# Patient Record
Sex: MALE | URBAN - METROPOLITAN AREA
[De-identification: names, ages, dates, MRNs, and addresses within clinical notes are randomized per-mention and may not be internally consistent; named-entity substitution may affect disease eponyms.]

---

## 2021-01-25 ENCOUNTER — OFFICE VISIT - HEALTHEAST (OUTPATIENT)
Dept: CARDIOLOGY | Facility: CLINIC | Age: 26
End: 2021-01-25

## 2021-01-25 DIAGNOSIS — Z00.6 EXAMINATION OF PARTICIPANT OR CONTROL IN CLINICAL RESEARCH: ICD-10-CM

## 2021-01-25 RX ORDER — ATORVASTATIN CALCIUM 10 MG/1
10 TABLET, FILM COATED ORAL AT BEDTIME
Status: SHIPPED | COMMUNITY
Start: 2021-01-25

## 2021-01-27 ENCOUNTER — AMBULATORY - HEALTHEAST (OUTPATIENT)
Dept: CARDIOLOGY | Facility: CLINIC | Age: 26
End: 2021-01-27

## 2021-01-27 DIAGNOSIS — Z00.6 EXAMINATION OF PARTICIPANT IN CLINICAL TRIAL: ICD-10-CM

## 2021-01-27 ASSESSMENT — MIFFLIN-ST. JEOR: SCORE: 2047.21

## 2021-02-17 ENCOUNTER — AMBULATORY - HEALTHEAST (OUTPATIENT)
Dept: CARDIOLOGY | Facility: CLINIC | Age: 26
End: 2021-02-17

## 2021-02-17 DIAGNOSIS — Z00.6 EXAMINATION OF PARTICIPANT IN CLINICAL TRIAL: ICD-10-CM

## 2021-03-03 ENCOUNTER — AMBULATORY - HEALTHEAST (OUTPATIENT)
Dept: CARDIOLOGY | Facility: CLINIC | Age: 26
End: 2021-03-03

## 2021-03-03 DIAGNOSIS — Z00.6 EXAMINATION OF PARTICIPANT IN CLINICAL TRIAL: ICD-10-CM

## 2021-03-03 ASSESSMENT — MIFFLIN-ST. JEOR: SCORE: 2029.52

## 2021-05-27 VITALS
SYSTOLIC BLOOD PRESSURE: 142 MMHG | TEMPERATURE: 98.4 F | HEART RATE: 90 BPM | RESPIRATION RATE: 18 BRPM | DIASTOLIC BLOOD PRESSURE: 88 MMHG | OXYGEN SATURATION: 95 %

## 2021-06-05 VITALS
WEIGHT: 230 LBS | BODY MASS INDEX: 32.93 KG/M2 | HEIGHT: 70 IN | OXYGEN SATURATION: 96 % | RESPIRATION RATE: 18 BRPM | HEART RATE: 89 BPM | DIASTOLIC BLOOD PRESSURE: 92 MMHG | SYSTOLIC BLOOD PRESSURE: 130 MMHG | TEMPERATURE: 98.2 F

## 2021-06-05 VITALS
HEART RATE: 109 BPM | HEIGHT: 70 IN | SYSTOLIC BLOOD PRESSURE: 130 MMHG | DIASTOLIC BLOOD PRESSURE: 94 MMHG | TEMPERATURE: 98.3 F | WEIGHT: 233.9 LBS | BODY MASS INDEX: 33.49 KG/M2

## 2021-06-16 NOTE — PROGRESS NOTES
How many people that you live with, currently attend school or  facilities outside of the home? None  If more than 1 person reported in question above,   Does anyone that the subject lives with, work in a job that requires in close proximity (less than 6 Unknown  feet) to other people, such as the jobs listed earlier []? Yes   [x]? No

## 2021-06-18 NOTE — PATIENT INSTRUCTIONS - HE
Patient Instructions by Melany Coy RN at 2/17/2021  8:00 AM     Author: Melany Coy RN Service: -- Author Type: Registered Nurse    Filed: 2/17/2021  8:20 AM Encounter Date: 2/17/2021 Status: Signed    : Melany Coy RN (Registered Nurse)           PREVENT-19 Day 21  Thank you for coming in today for your 2nd injection.  Please continue to add your temperature daily in the zahra.  If you have symptoms, please wait to start the nasal swab collections until you have been contacted by the study team and instructed to do so.    Your next visit is Day 35. We have scheduled this for 03/03/21 0800  If you have questions, you may call the [facility] Research department at [phone]    Best regards,  Melany Coy RN

## 2021-06-18 NOTE — PATIENT INSTRUCTIONS - HE
Patient Instructions by Edison Mcmanus at 3/3/2021  8:00 AM     Author: Edison Mcmanus Service: -- Author Type: Research Associate    Filed: 3/3/2021  8:06 AM Encounter Date: 3/3/2021 Status: Signed    : Edison Mcmanus (Research Associate)           Thank you for coming in today for the Day 35 visit.  Your next study visit is Month 3.   Reminder: this visit will be at the [place] [place] [facility] ([facility]).  The address for the [facility] is: [address] [place] [address]  The phone number is [phone]  We have provided you with a map for your reference too.    The study team from the  [facility] will be in contact with you to schedule your next study visit.     The PREVENT-19 team at [facility] wishes you all the best and appreciates the opportunity to partner with you in this vaccine trial.  We wish you all the best in the future.    Best regards,  Edison Mcmanus, Research Associate

## 2021-06-30 NOTE — PROGRESS NOTES
"Progress Notes by Nancy Lassiter RN at 1/27/2021  8:00 AM     Author: Nancy Lassiter RN Service: -- Author Type: Registered Nurse    Filed: 1/27/2021 10:50 AM Encounter Date: 1/27/2021 Status: Signed    : Dionne Baeza MD (Physician)    Related Notes: Original Note by Nancy Lassiter RN (Registered Nurse) filed at 1/27/2021 10:47 AM           Study Name: PREVENT-19  : Ijeoma Danielle MD   Sub Investigator: Dre Baeza MD    Protocol version: 3.0, 16 NOV 2020    Inclusion Criteria  Criteria #   Inclusion Criteria (all must be yes)    1  Adults ? 18 years of age at screening who, by virtue of age, race, ethnicity or life circumstances, are considered at substantial risk of exposure to and infection with SARS-CoV-2   Yes   2  Willing and able to give informed consent prior to study enrollment and to comply with study procedu   Yes   3  Participants of childbearing potential (defined as any participant who has experienced menarche and who is NOT surgically sterile [ie, hysterectomy, bilateral tubal ligation, or bilateral oophorectomy] or postmenopausal [defined as amenorrhea at least 12 consecutive months]) must agree to be heterosexually inactive from at least 28 days prior to enrollment and through 3 months after the last vaccination OR agree to consistently use a medically acceptable method of contraception from at least 28 days prior to enrollment and through 3 months after the last vaccination   Yes   4  Is medically stable, as determined by the investigator (based on review of health status, vital signs (TPRBP) medical history, & targeted physical examination (weight)  VS must be within medically acceptable ranges prior to the first vaccination.   Yes   5  Agree to not participate in any other SARS-CoV-2 prevention trial during the study follow-up.   Yes     Exclusion Criteria  Criteria #  -all must be \"no\"    1  Unstable acute or chronic illness. Criteria " for unstable medical conditions include   a. Substantive changes in chronic prescribed medication (change in class or significant change in dose) in the past 2 months  b. Currently undergoing workup of undiagnosed illness that could lead to diagnosis of a new condition   Note: Well-controlled human immunodeficiency virus [HIV] with undetectable HIV RNA and CD4 count > 200 cells/?L for at least 1 year, documented within the last 6 months, is NOT considered an unstable chronic illness.    No   2  Participation in research involving an investigational product (drug/biologic/device) within 45 days prior to first study vaccination   No   3  History of a previous laboratory-confirmed diagnosis of SARS-CoV-2 infection or COVID-19   No   4  Received influenza vaccination or any other adult vaccine within 4 days prior to or within 7 days after either study vaccination   No   5  Autoimmune or immunodeficiency disease/condition (iatrogenic or congenital) requiring ongoing immunomodulatory therapy NOTE: Stable endocrine disorders (eg, thyroiditis, pancreatitis), including stable diabetes mellitus with no history of diabetic ketoacidosis) are NOT excluded   No   6  Chronic administration (defined as > 14 continuous days) of immunosuppressant, systemic glucocorticoids, or other immune-modifying drugs within 90 days prior to first study vaccination. NOTE: An immunosuppressant dose of glucocorticoid is defined as a systemic dose ? 20 mg of prednisone per day or equivalent. The use of topical, inhaled, and nasal glucocorticoids is permitted. Topical tacrolimus and ocular cyclosporin are permitted   No   7  Received immunoglobulin, blood-derived products, or immunosuppressant drugs within 90 days prior to first study vaccination   No   8  Active cancer (malignancy) on therapy within 1 year prior to first study vaccination (with the exception of malignancy cured via excision, at the discretion of the investigator)   No   9  Any known  allergies to products contained in the investigational product.   No   10  Participants who are breastfeeding, pregnant or who plan to become pregnant within 3 months following last study vaccination   No   11  Any other condition that, in the opinion of the investigator, would pose a health risk to the participant if enrolled or could interfere with evaluation of the trial vaccine or interpretation of study results.   No   12  Study team member or first-degree relative of any study team member (inclusive of Sponsor, and study site personnel involved in the study)   No   13  Current participation in any other COVID-19 prevention clinical trial.   No       Subject has met all inclusion criteria and no exclusion criteria have been met.   Subject is ready to fully enrolled in the PREVENT-19 study.    Nancy Lassiter RN       Visits Screening/Baseline (1)    Time seated: 0815    The study discussion continued with an introduction of the study purpose and the qualifications for participation.     The consent discussion included the following:    Description of trial    Number of Participants    Risks and Discomforts    Unforeseeable Risks    Benefits    Alternative Procedures or Treatments    Confidentiality    Compensation and Medical Treatments in Event of Injury    Contacts    Voluntary Participation    Involuntary Termination of Participant's Participation    Additional Costs to Participant    Consequences of Participant's Decision to Withdraw    Providing Significant New Findings to Participants      Jorge Luis Treviño was provided time to consider participation and ask questions.  All questions answered to his satisfaction.  Jorge Luis Treviño was queried regarding their understanding of the trial. he was able to correctly answer the following questions:    Double blind placebo control    Follow up visits    Need to report side effects and/or possible COVID-19 symptoms       Jorge Luis Treviño has agreed to participate in  "the \"PREVENT-19\" COVID-19 vaccine clinical trial.  Consent form signed (version 3, IRB approved Nov 25, 2020), copies of consent signatures provided to participant.  No study procedures performed prior to obtaining consent to participate.       In person, scheduled  In the last 2 weeks, did the participant attend any large gatherings (> 10 people), or come in close contact (<6 feet) with a person known to  have COVID-19, returned to school or to work in-person?  [] Yes   [x] No    Reviewed Inclusion/Exclusion criteria--please refer to that note for details and for co-sign by Dr. Baeza/LAURIE.    1995   male  Ethnicity   []  or    [x] Not  or   Race   []  or    [x]    [] Black or   []  or other    [] White    Occupation   Attending school in person   [] Yes   [x] No   Currently working    [x] Yes   [] No    If yes: Required to be in close proximity (<6 ft) to other people?   [] Yes   [x] No   How often is participant required to be present in workplace (I.e., not work from home [[employer]])    [x] 0 days/week  [] 1 day/week  [] 2-4 days/week  [] >5 days/week  Do people in participant's main workplace use PPE? [] Yes   [] No N/A    Living Situation  How many people live with the subject (other than subject)? 2  Total people under 18 years of age 0  Total people between 18-64 years of age 2  Total people 65 years of age or older  0    How many people that you live with, currently attend school or  facilities outside of the home? No  If more than 1 person reported in question above,   Does anyone that the subject lives with, work in a job that requires in close proximity (less than 6 Unknown  feet) to other people, such as the jobs listed earlier [] Yes   [] No    Lifestyle  Does the subject have a history of smoking or vaping? [] Yes   [x] No  Is the subject currently smoking or vaping?   [] Yes   [x] " "No    Has the subject experienced any past and/or concomitant medical conditions or significant surgical procedures? [] Yes   [x] No  If yes, please see medical history below.    Randomization  Date of randomization: 27 Jan2021  Randomization number: [id number]  Activation code: [id number]  Age group  [x] 18-64 years  [] > 65 years    Physical Exam  Please refer to note for PE details  VS-T,P, R, BP, wt, ht  Visit Vitals  BP (!) 143/92 (Patient Site: Left Arm, Patient Position: Sitting, Cuff Size: Adult Regular)   Pulse 96   Temp 98.3  F (36.8  C) (Oral)   Ht 5' 10\" (1.778 m)     There were no vitals filed for this visit.   Height: 5'10\"  Weight 233.9 lbs    Study labs (Immunogenicity, SARS-CoV-2 (anti-NP))  drawn   [x] Yes   Time 0856 [] No, why?     Nasal swab collection:  [x] Yes   Time 0903 [] No, why?          Investigational vaccine injected left deltoid at 0945      Participant waited in Clinical Research Unit (CRU) for @ least 30 minutes after receiving injection.  Participant experienced no adverse symptoms prior to leaving CRU  Study team reviewed completion instructions with participant  Participant provided with eDiary to record all subsequent AEs and COVID-19 symptomatology.  Participant provided with nasal swab home collection kit and instructions on completion (refer to XXX for complete details)  Participant provided with thermometer and reviewed instructions on how to take oral temperature.  Participant provided with a ruler to measure any site reactions that occur; instructed on how to use.  Participant provided with After Visit Summary that includes these instructions and next appointments.  he confirms that all questions have been answered to his satisfaction.    Plan: Next study visit (Visit 2) scheduled Visit date not found    Discharge time 1015    Nancy Lassiter RN    Current Outpatient Medications   Medication Sig   ? atorvastatin (LIPITOR) 10 MG tablet Take 10 mg by mouth at bedtime.   ? " fluticasone propion-salmeteroL (ADVAIR) 100-50 mcg/dose DISKUS Inhale 1 puff 2 (two) times a day.       Past Medical History:   Diagnosis Date   ? Hyperlipidemia 2018   ? TMJ (dislocation of temporomandibular joint) 2014

## 2021-06-30 NOTE — PROGRESS NOTES
"Progress Notes by Edison Mcmanus at 3/3/2021  8:00 AM     Author: Edison Mcmanus Service: -- Author Type: Research Associate    Filed: 3/3/2021  8:33 AM Encounter Date: 3/3/2021 Status: Signed    : Edison Mcmanus (Research Associate)           Visit 3  3/3/21  In person, scheduled  In the last 2 weeks, did the participant attend any large gatherings (> 10 people), or come in close contact (<6 feet) with a person known to have COVID-19, returned to school or to work in-person?  [] Yes   [x] No    Ongoing consent process: review schedule of events for today, and upcoming appointments; provided him with an update on known overall study progress; reviewed eDiary expectations; reiterated expected AEs (especially associated with injection site).  he has no further questions or concerns at this time.     Adverse Event/Serious Adverse Event: asked participant if any AE/SAEs occurred since last contact (2/17/21)  Patient reports no AE/SAEs since the second injection.       Physical Exam   Please refer to Emilee Brady (PA) note for PE details    Visit Vitals  BP (!) 130/92 (Patient Site: Left Arm, Patient Position: Sitting, Cuff Size: Adult Large) (NCS)   Pulse 89   Temp 98.2  F (36.8  C) (Oral)   Resp 18   Ht 5' 10\" (1.778 m)   Wt (!) 230 lb (104.3 kg)   SpO2 96%   BMI 33.00 kg/m      Vitals:    03/03/21 0808   Weight: (!) 230 lb (104.3 kg)          Study labs ([facility], Immunogenicity, SARS-CoV-2 (anti-NP))  drawn   [x] Yes   Time 8:20 AM [] No, why?   Requisition number: [id number]      Reviewed eDiary with participant.  Study team reviewed completion instructions with participant and questions answered to his satisfaction.      Plan: Next study visit (Visit 4) scheduled Visit date not found   NOTE: remind participant that this next visit will occur @ [facility] (noted on AVS)      Edison Mcmanus       "

## 2021-06-30 NOTE — PROGRESS NOTES
Progress Notes by Melany Coy RN at 2/17/2021  8:00 AM     Author: Melany Coy RN Service: -- Author Type: Registered Nurse    Filed: 2/17/2021  9:54 AM Encounter Date: 2/17/2021 Status: Signed    : Melany Coy RN (Registered Nurse)           Visit 2  2/17/21  In person, scheduled  Time seated:0805  In the last 2 weeks, did the participant attend any large gatherings (> 10 people), or come in close contact (<6 feet) with a person known to  have COVID-19, returned to school or to work in-person?  [] Yes   [x] No    Reviewed Inclusion/Exclusion criteria--please refer to that note for details and for co-sign by Dr. Baeza.   Participants meeting the following criterion may be delayed for subsequent vaccination:     Respiratory symptoms in the past 3 days (ie, body temperature of > 38.0 C, cough, sore throat, difficulty breathing).   o Participant may be vaccinated when all symptoms have been resolved for > 3 days.   o Out of window vaccination is allowed for this reason.     Ongoing consent process: review schedule of events for today, and upcoming appointments; provided him with an update on known overall study progress; reviewed eDiary expectations; reiterated expected AEs (especially associated with injection site).  he has no further questions or concerns at this time.     Adverse Event/Serious Adverse Event: asked participant if any AE/SAEs occurred since last contact (1/27/21 date of last contact)    he denies AE/SAEs          Physical Exam   Please refer to PA  note for PE details  VS-T,P, R, BP  Visit Vitals  /88 (Patient Site: Right Arm)   Pulse 90   Temp 98.4  F (36.9  C) (Oral)   Resp 18   SpO2 95%       Study labs SARS-CoV-2 vaccine immunogenicity (IgG antibody to SARS-CoV-2 S protein, MN, hACE2 inhibition) drawn   [x] Yes   Time 0833 [] No, why?   Requisition number: [id number]    Administrations This Visit     Study SARS-CoV-2 vaccine vs. placebo (IDS# [id number]) injection injection  0.5 mL     Admin Date  02/17/2021 0907 Action  Given Dose  0.5 mL Route  Intramuscular Administered By  Melany Coy RN                  Reviewed eDiary with participant.  Study team reviewed completion instructions with participant and questions answered to his satisfaction.  Participant waited in Clinical Research Unit (CRU) for @ least 30 minutes after receiving injection.  Participant experienced no adverse symptoms prior to leaving CRU  Discharge time: 0900    Plan: Next study visit (Visit 3) scheduled       Melany Coy RN

## 2021-06-30 NOTE — PROGRESS NOTES
Progress Notes by Emilee Brady PA-C at 2/17/2021  8:00 AM     Author: Emilee Brady PA-C Service: -- Author Type: Physician Assistant    Filed: 2/17/2021  8:57 AM Encounter Date: 2/17/2021 Status: Signed    : Emilee Brady PA-C (Physician Assistant)             What was the body system examined: Defaulted value based on protocol requirements and will not require .   System  Normal/Abnormal  If abnormal, CS?   If abnormal, describe    Skin  Normal  Not applicable     HEENT Normal  Not applicable     Neck  Normal  Not applicable     Thyroid  Normal  Not applicable      Lungs  Normal  Not applicable     Heart  Normal  Not applicable     Abdomen  Normal  Not applicable     Lymph Nodes  Normal  Not applicable     Musculoskeletal/Extremities  Normal  Not applicable     Other  Normal  Not applicable        What was the overall interpretation: Please select from the dropdown list.  Normal    COLTEN Suarez PA-C

## 2021-06-30 NOTE — PROGRESS NOTES
"Progress Notes by La Nena Hemphill RN at 1/25/2021  8:00 AM     Author: La Nena Hemphill RN Service: -- Author Type: Registered Nurse    Filed: 1/25/2021  9:08 AM Encounter Date: 1/25/2021 Status: Signed    : La Nena Hemphill RN (Registered Nurse)     Summary: Novavax vaccine study          Study name PREVENT-19  Protocol Title:  A Phase 3, Randomized, Observer-Blinded, Placebo-Controlled Study to Evaluate the Efficacy, Safety, and Immunogenicity of a SARS-CoV-2 Recombinant Tim Protein Nanoparticle Vaccine (SARS-CoV-2 rS) with Matrix-M1? Adjuvant in Adult Participants ? 18 Years     Research La Nena Hemphill RNnurse associate spoke with Jorge Luis Treviño and/or their family by phone to discuss participation in the Novavax study.    The study discussion continued with an introduction of the study purpose and the qualifications for participation.     The consent discussion included the following:    Description of trial    Number of Participants    Risks and Discomforts    Unforeseeable Risks    Benefits    Alternative Procedures or Treatments    Confidentiality    Compensation and Medical Treatments in Event of Injury    Contacts    Voluntary Participation    Involuntary Termination of Participant's Participation    Additional Costs to Participant    Consequences of Participant's Decision to Withdraw    Providing Significant New Findings to Participants      Jorge Luis Treviño was provided time to consider participation and ask questions.  All questions answered to his satisfaction.  Jorge Luis Treviño was queried regarding their understanding of the trial. he was able to correctly answer the following questions:    Double blind placebo control    Follow up visits    Need to report side effects and/or possible COVID-19 symptoms       Jorge Luis Treviño has preliminarily agreed to participate in the \"PREVENT-19\" COVID-19 vaccine clinical trial.  he will sign consent form in person on 01- after in person continuation of consent process. " This visit is scheduled for 0800.    La Nena Hemphill RN    A review of medical history and medications was done to screen for items that may impact ability to participate in the trial.     Most recent Influenza vaccine:     Must be >4 days prior to Novavax vaccine  Any other vaccines (name & dates):  None   Must be >7 days prior to Novavax vaccine    Medical History  Need: Start date (year @ minimum), end date (year @ minimum), or ongoing  History reviewed. No pertinent past medical history.    Hospitalized in last 6 months? [] Yes   [x] No    La Nena Hemphill RN

## 2021-06-30 NOTE — PROGRESS NOTES
Progress Notes by Emilee Brady PA-C at 3/3/2021  8:00 AM     Author: Emilee Brady PA-C Service: -- Author Type: Physician Assistant    Filed: 3/3/2021  8:36 AM Encounter Date: 3/3/2021 Status: Signed    : Emilee Brady PA-C (Physician Assistant)             What was the body system examined: Defaulted value based on protocol requirements and will not require .   System  Normal/Abnormal  If abnormal, CS?   If abnormal, describe    Skin  Normal  Not applicable     HEENT Normal  Not applicable     Neck  Normal  Not applicable     Thyroid  Normal  Not applicable      Lungs  Normal  Not applicable     Heart  Normal  Not applicable     Abdomen  Normal  Not applicable     Lymph Nodes  Normal  Not applicable     Musculoskeletal/Extremities  Normal  Not applicable     Other  Normal  Not applicable        What was the overall interpretation: Please select from the dropdown list.  Normal    COLTEN Suarez PA-C

## 2021-06-30 NOTE — PROGRESS NOTES
Progress Notes by Lea Terrell CNP at 1/27/2021  8:00 AM     Author: Lea Terrell CNP Service: -- Author Type: Nurse Practitioner    Filed: 1/27/2021 10:47 AM Encounter Date: 1/27/2021 Status: Signed    : Lea Terrell CNP (Nurse Practitioner)             What was the body system examined: Defaulted value based on protocol requirements and will not require .   System  Normal/Abnormal  If abnormal, CS?   If abnormal, describe    Skin  Normal  Not applicable     HEENT Normal  Not applicable     Neck  Normal  Not applicable     Thyroid  Normal  Not applicable      Lungs  Normal  Not applicable     Heart  Normal  Not applicable     Abdomen  Normal  Not applicable     Lymph Nodes  Normal  Not applicable     Musculoskeletal/Extremities  Normal  Not applicable     Other  Normal  Not applicable        What was the overall interpretation: Please select from the dropdown list.  Normal    Lea Terrell CNP

## 2021-08-15 ENCOUNTER — HEALTH MAINTENANCE LETTER (OUTPATIENT)
Age: 26
End: 2021-08-15

## 2021-10-11 ENCOUNTER — HEALTH MAINTENANCE LETTER (OUTPATIENT)
Age: 26
End: 2021-10-11

## 2022-09-25 ENCOUNTER — HEALTH MAINTENANCE LETTER (OUTPATIENT)
Age: 27
End: 2022-09-25

## 2023-10-14 ENCOUNTER — HEALTH MAINTENANCE LETTER (OUTPATIENT)
Age: 28
End: 2023-10-14